# Patient Record
Sex: MALE | Race: WHITE | NOT HISPANIC OR LATINO | URBAN - METROPOLITAN AREA
[De-identification: names, ages, dates, MRNs, and addresses within clinical notes are randomized per-mention and may not be internally consistent; named-entity substitution may affect disease eponyms.]

---

## 2022-01-14 ENCOUNTER — EMERGENCY (EMERGENCY)
Facility: HOSPITAL | Age: 42
LOS: 1 days | Discharge: HOME | End: 2022-01-14
Attending: EMERGENCY MEDICINE | Admitting: EMERGENCY MEDICINE
Payer: COMMERCIAL

## 2022-01-14 VITALS
DIASTOLIC BLOOD PRESSURE: 89 MMHG | OXYGEN SATURATION: 99 % | SYSTOLIC BLOOD PRESSURE: 143 MMHG | HEART RATE: 82 BPM | RESPIRATION RATE: 18 BRPM | TEMPERATURE: 98 F | HEIGHT: 70 IN | WEIGHT: 229.94 LBS

## 2022-01-14 VITALS
HEART RATE: 78 BPM | TEMPERATURE: 98 F | RESPIRATION RATE: 18 BRPM | OXYGEN SATURATION: 99 % | SYSTOLIC BLOOD PRESSURE: 119 MMHG | DIASTOLIC BLOOD PRESSURE: 73 MMHG

## 2022-01-14 DIAGNOSIS — Z20.822 CONTACT WITH AND (SUSPECTED) EXPOSURE TO COVID-19: ICD-10-CM

## 2022-01-14 DIAGNOSIS — Z98.890 OTHER SPECIFIED POSTPROCEDURAL STATES: Chronic | ICD-10-CM

## 2022-01-14 DIAGNOSIS — F12.929 CANNABIS USE, UNSPECIFIED WITH INTOXICATION, UNSPECIFIED: ICD-10-CM

## 2022-01-14 DIAGNOSIS — F41.9 ANXIETY DISORDER, UNSPECIFIED: ICD-10-CM

## 2022-01-14 LAB
ALBUMIN SERPL ELPH-MCNC: 5 G/DL — SIGNIFICANT CHANGE UP (ref 3.3–5)
ALP SERPL-CCNC: 63 U/L — SIGNIFICANT CHANGE UP (ref 40–120)
ALT FLD-CCNC: 27 U/L — SIGNIFICANT CHANGE UP (ref 10–45)
ANION GAP SERPL CALC-SCNC: 12 MMOL/L — SIGNIFICANT CHANGE UP (ref 5–17)
APTT BLD: 34.8 SEC — SIGNIFICANT CHANGE UP (ref 27.5–35.5)
AST SERPL-CCNC: 23 U/L — SIGNIFICANT CHANGE UP (ref 10–40)
BASOPHILS # BLD AUTO: 0.03 K/UL — SIGNIFICANT CHANGE UP (ref 0–0.2)
BASOPHILS NFR BLD AUTO: 0.3 % — SIGNIFICANT CHANGE UP (ref 0–2)
BILIRUB SERPL-MCNC: 0.6 MG/DL — SIGNIFICANT CHANGE UP (ref 0.2–1.2)
BUN SERPL-MCNC: 23 MG/DL — SIGNIFICANT CHANGE UP (ref 7–23)
CALCIUM SERPL-MCNC: 9.3 MG/DL — SIGNIFICANT CHANGE UP (ref 8.4–10.5)
CHLORIDE SERPL-SCNC: 103 MMOL/L — SIGNIFICANT CHANGE UP (ref 96–108)
CK SERPL-CCNC: 130 U/L — SIGNIFICANT CHANGE UP (ref 30–200)
CO2 SERPL-SCNC: 25 MMOL/L — SIGNIFICANT CHANGE UP (ref 22–31)
CREAT SERPL-MCNC: 1.13 MG/DL — SIGNIFICANT CHANGE UP (ref 0.5–1.3)
EOSINOPHIL # BLD AUTO: 0.16 K/UL — SIGNIFICANT CHANGE UP (ref 0–0.5)
EOSINOPHIL NFR BLD AUTO: 1.7 % — SIGNIFICANT CHANGE UP (ref 0–6)
GLUCOSE SERPL-MCNC: 109 MG/DL — HIGH (ref 70–99)
HCT VFR BLD CALC: 39.8 % — SIGNIFICANT CHANGE UP (ref 39–50)
HGB BLD-MCNC: 14 G/DL — SIGNIFICANT CHANGE UP (ref 13–17)
IMM GRANULOCYTES NFR BLD AUTO: 0.4 % — SIGNIFICANT CHANGE UP (ref 0–1.5)
INR BLD: 1.05 — SIGNIFICANT CHANGE UP (ref 0.88–1.16)
LYMPHOCYTES # BLD AUTO: 1.28 K/UL — SIGNIFICANT CHANGE UP (ref 1–3.3)
LYMPHOCYTES # BLD AUTO: 13.5 % — SIGNIFICANT CHANGE UP (ref 13–44)
MCHC RBC-ENTMCNC: 29.1 PG — SIGNIFICANT CHANGE UP (ref 27–34)
MCHC RBC-ENTMCNC: 35.2 GM/DL — SIGNIFICANT CHANGE UP (ref 32–36)
MCV RBC AUTO: 82.7 FL — SIGNIFICANT CHANGE UP (ref 80–100)
MONOCYTES # BLD AUTO: 0.7 K/UL — SIGNIFICANT CHANGE UP (ref 0–0.9)
MONOCYTES NFR BLD AUTO: 7.4 % — SIGNIFICANT CHANGE UP (ref 2–14)
NEUTROPHILS # BLD AUTO: 7.26 K/UL — SIGNIFICANT CHANGE UP (ref 1.8–7.4)
NEUTROPHILS NFR BLD AUTO: 76.7 % — SIGNIFICANT CHANGE UP (ref 43–77)
NRBC # BLD: 0 /100 WBCS — SIGNIFICANT CHANGE UP (ref 0–0)
PCP SPEC-MCNC: SIGNIFICANT CHANGE UP
PLATELET # BLD AUTO: 248 K/UL — SIGNIFICANT CHANGE UP (ref 150–400)
POTASSIUM SERPL-MCNC: 4.3 MMOL/L — SIGNIFICANT CHANGE UP (ref 3.5–5.3)
POTASSIUM SERPL-SCNC: 4.3 MMOL/L — SIGNIFICANT CHANGE UP (ref 3.5–5.3)
PROT SERPL-MCNC: 7.9 G/DL — SIGNIFICANT CHANGE UP (ref 6–8.3)
PROTHROM AB SERPL-ACNC: 12.6 SEC — SIGNIFICANT CHANGE UP (ref 10.6–13.6)
RBC # BLD: 4.81 M/UL — SIGNIFICANT CHANGE UP (ref 4.2–5.8)
RBC # FLD: 12.3 % — SIGNIFICANT CHANGE UP (ref 10.3–14.5)
SARS-COV-2 RNA SPEC QL NAA+PROBE: SIGNIFICANT CHANGE UP
SODIUM SERPL-SCNC: 140 MMOL/L — SIGNIFICANT CHANGE UP (ref 135–145)
TROPONIN T SERPL-MCNC: 0.01 NG/ML — SIGNIFICANT CHANGE UP (ref 0–0.01)
WBC # BLD: 9.47 K/UL — SIGNIFICANT CHANGE UP (ref 3.8–10.5)
WBC # FLD AUTO: 9.47 K/UL — SIGNIFICANT CHANGE UP (ref 3.8–10.5)

## 2022-01-14 PROCEDURE — 80307 DRUG TEST PRSMV CHEM ANLYZR: CPT

## 2022-01-14 PROCEDURE — U0003: CPT

## 2022-01-14 PROCEDURE — 99285 EMERGENCY DEPT VISIT HI MDM: CPT

## 2022-01-14 PROCEDURE — 80053 COMPREHEN METABOLIC PANEL: CPT

## 2022-01-14 PROCEDURE — 85730 THROMBOPLASTIN TIME PARTIAL: CPT

## 2022-01-14 PROCEDURE — 84484 ASSAY OF TROPONIN QUANT: CPT

## 2022-01-14 PROCEDURE — 36415 COLL VENOUS BLD VENIPUNCTURE: CPT

## 2022-01-14 PROCEDURE — 93010 ELECTROCARDIOGRAM REPORT: CPT

## 2022-01-14 PROCEDURE — 99284 EMERGENCY DEPT VISIT MOD MDM: CPT

## 2022-01-14 PROCEDURE — U0005: CPT

## 2022-01-14 PROCEDURE — 93005 ELECTROCARDIOGRAM TRACING: CPT

## 2022-01-14 PROCEDURE — 82550 ASSAY OF CK (CPK): CPT

## 2022-01-14 PROCEDURE — 85610 PROTHROMBIN TIME: CPT

## 2022-01-14 PROCEDURE — 85025 COMPLETE CBC W/AUTO DIFF WBC: CPT

## 2022-01-14 RX ORDER — SODIUM CHLORIDE 9 MG/ML
500 INJECTION INTRAMUSCULAR; INTRAVENOUS; SUBCUTANEOUS ONCE
Refills: 0 | Status: COMPLETED | OUTPATIENT
Start: 2022-01-14 | End: 2022-01-14

## 2022-01-14 RX ADMIN — SODIUM CHLORIDE 500 MILLILITER(S): 9 INJECTION INTRAMUSCULAR; INTRAVENOUS; SUBCUTANEOUS at 13:20

## 2022-01-14 NOTE — ED ADULT TRIAGE NOTE - CHIEF COMPLAINT QUOTE
pt states " I took and edible to calm myself down, but now I have chest pain, sob, I feel very anxious" ekg in progress.

## 2022-01-14 NOTE — ED PROVIDER NOTE - CLINICAL SUMMARY MEDICAL DECISION MAKING FREE TEXT BOX
40 yo with no PMH presenting with anxiety and CP after taking 50 mg edible. Does not use marijuana regularly. Likely anxiety 2/2 to marijuana use. EKG NSR, no ischemic changes. f/u labs. Obs patient. If labs normal, d/c when THC effects wear off. 40 yo with no PMH presenting with anxiety and CP after taking 50 mg edible. Does not use marijuana regularly. Likely anxiety 2/2 to marijuana use. EKG NSR, no ischemic changes. Trop neg. Labs WNL. Continue obs. d/c when THC effects wear off.

## 2022-01-14 NOTE — ED PROVIDER NOTE - OBJECTIVE STATEMENT
42 yo with no pmh presents to ED after taking 50 mg 42 yo with no pmh presents to ED after taking 50 mg edible with anxiety, CP, and SOB. Reports at 9 am this morning he took edible. 30 mins later he felt off followed by paranoia, anxiety, feeling shaky, CP, SOB. His brother brought him to the ED. He uses marijuana very rarely, about 4x per year. Never taken an edible before, but has never had bad reaction after smoking.  He also had a pre-workout energy drink this morning prior to the gym. Felt well prior to edible. Denies fevers, HA, n/v/d. Takes no medications. No psych history, no h/o panic attacks or anxiety.

## 2022-01-14 NOTE — ED ADULT NURSE NOTE - OBJECTIVE STATEMENT
Pt presented with chief complaints of increasing anxiety and chest pain after ingestion of 50mg of edible this rning . AOX4, speaking full sentences, family @  bedside.  +PERRLA.  Patient denies shortness of breath, difficulty breathing and any form of distress not noted.  Chest rise equal and symmetrical bilaterally, trachea midline and lung sounds clear in BL upper and lower bases. Resps even and nonlabored.  Patient oriented to ED area. All needs attended. POC reviewed. Placed on continuos cardiac monitoring. EKG done.  Fall risk precautions maintained. Pt presented with chief complaints of increasing anxiety and chest pain after ingestion of 50mg of edible this morning . AOX4, speaking full sentences, family @  bedside.  +PERRLA.  Patient denies shortness of breath, difficulty breathing and any form of distress not noted.  Chest rise equal and symmetrical bilaterally, trachea midline and lung sounds clear in BL upper and lower bases. Resps even and nonlabored.  Patient oriented to ED area. All needs attended. POC reviewed. Placed on continuos cardiac monitoring. EKG done.  Fall risk precautions maintained.

## 2022-01-14 NOTE — ED PROVIDER NOTE - PHYSICAL EXAMINATION
Gen: lying in bed, shaking, appears uncomfortable  Neuro: A&Ox4  CV: RRR  Pulm: CTAB  Abd:+BS, Soft, tender, nondistended  Psych: endorses anxiety, denies hallucinations, SI/HI

## 2022-01-14 NOTE — ED PROVIDER NOTE - ATTENDING CONTRIBUTION TO CARE
Attending Statement: I have personally performed a face to face diagnostic evaluation on this patient. I have reviewed the CP2whed and agree with the history, exam and plan of care, except as noted.     Attending Contribution to Care:  41M with no PMH presenting with anxiety and CP after taking 50 mg edible. Does not use marijuana regularly. Agree with MS exam. Likely anxiety 2/2 to marijuana use. EKG NSR, no ischemic changes. Trop neg. Labs WNL. Pt observed and hydrated in the ER and now feeling much better. Pt feeling improved and is stable for DC. ED evaluation and management discussed with the patient in detail.  Close PMD follow up encouraged.  Strict ED return instructions discussed in detail and patient given the opportunity to ask any questions about their discharge diagnosis and instructions. Patient verbalized understanding.

## 2022-01-14 NOTE — ED PROVIDER NOTE - NSFOLLOWUPINSTRUCTIONS_ED_ALL_ED_FT
You were seen in the emergency room for anxiety and chest pain after marijuana use. Your EKG and cardiac labs (troponin) did no show any signs of cardiac complications. We gave you IV fluids and your symptoms improved over your visit to the ED. Upon discharge, you reported your symptoms resolved.    Your diagnosis is anxiety secondary to marijuana intoxication.     There are no signs of emergency conditions requiring admission to the hospital on today's workup.  Based on your evaluation, a preliminary diagnosis was made, however, further evaluation may be required by your primary care physician or a specialist for a more definitive diagnosis.  Please follow-up as directed and/or return to the Emergency Department if your symptoms change or worsen.    Follow up care:   1. See your primary care physician within the next 72 hours for follow up.  Bring a copy of your discharge paperwork (including any test results) to your doctor.    Cannabis Abuse     WHAT YOU NEED TO KNOW:    Cannabis (marijuana) abuse is a pattern of use that causes physical or mental problems. Cannabis can make you feel high, happy, or excited. The effects may start right away and last for 3 to 4 hours depending on whether you smoke or eat cannabis.    DISCHARGE INSTRUCTIONS:    Return to the emergency department if:     The effects of cannabis have worn off, and you have shortness of breath, a fast heart rate, or chest pain.        Contact your healthcare provider if:     You cannot fight the urge to use cannabis.      You have stopped using cannabis, and feel that you cannot cope with your withdrawal symptoms.      You want help or more information on how to decrease or stop using cannabis.      You have questions or concerns about your condition or care.    Signs of cannabis abuse:     Use prevents you from functioning at work or school, or causes you to be absent often or to do poor work      Use when it is dangerous to be under the effects of the drug, such as when you are driving a vehicle or using machinery       Problems with the police when you are under the effects of cannabis      Continuing to use cannabis even when you argue with your family and friends about your use      More cannabis is needed to give you the high feeling or other effects that you want      Withdrawal symptoms after you stop using cannabis    Signs of cannabis withdrawal: Cannabis withdrawal happens when you have used cannabis for a long period of time, and you suddenly take less or stop taking it. Withdrawal symptoms may start on the first day and may last up to 2 weeks. You may have more than one of the following:     Decreased appetite and weight loss       Night sweats and trouble sleeping      Craving for cannabis      Irritability       Feeling agitated, anxious, or restless      Depressed or negative mood    Treatment: Drug treatment or therapy is often used to treat cannabis abuse. You may need to stay in a treatment facility, or you may have outpatient therapy sessions. Therapy can be done with you and a talk therapist or in a group with others. This can help you learn good coping skills and ways to manage stress. The goal is to help you decrease or stop cannabis abuse in manageable steps. Your healthcare provider can give you more information about available drug and therapy treatments.    Follow up with your healthcare provider as directed: Write down your questions so you remember to ask them during your visits.       Return precautions:    *** Return immediately if you have chest pain, difficulty breathing, fever, a headinjury, wounds that won't stop bleeding, difficulty moving an arm or leg, unexplained sensory changes, or any other new/concerning symptoms. ***

## 2022-01-14 NOTE — ED ADULT NURSE REASSESSMENT NOTE - NS ED NURSE REASSESS COMMENT FT1
Patient given a warm blanket, placed in position of comfort and pillow. Will continue with plan of care.

## 2022-01-14 NOTE — ED PROVIDER NOTE - PATIENT PORTAL LINK FT
You can access the FollowMyHealth Patient Portal offered by [facility] by registering at the following website: [email or web address]. By joining [employer]’s FollowMyHealth portal, you will also be able to view your health information using other applications (apps) compatible with our system.

## 2024-08-05 NOTE — ED PROVIDER NOTE - CARDIOVASCULAR [+], MLM
Called patients daughter back.  No answer message left to call the office back.    CHEST PAIN/PALPITATIONS

## 2025-03-30 ENCOUNTER — INPATIENT (INPATIENT)
Facility: HOSPITAL | Age: 45
LOS: 1 days | Discharge: HOME | DRG: 446 | End: 2025-04-01
Attending: SURGERY | Admitting: SURGERY
Payer: COMMERCIAL

## 2025-03-30 VITALS
DIASTOLIC BLOOD PRESSURE: 88 MMHG | SYSTOLIC BLOOD PRESSURE: 165 MMHG | HEIGHT: 70 IN | RESPIRATION RATE: 22 BRPM | WEIGHT: 231.49 LBS | HEART RATE: 69 BPM | TEMPERATURE: 98 F | OXYGEN SATURATION: 97 %

## 2025-03-30 DIAGNOSIS — Z98.890 OTHER SPECIFIED POSTPROCEDURAL STATES: Chronic | ICD-10-CM

## 2025-03-30 PROCEDURE — 99285 EMERGENCY DEPT VISIT HI MDM: CPT

## 2025-03-30 NOTE — ED ADULT TRIAGE NOTE - CHIEF COMPLAINT QUOTE
"I was at the [employer] game and when I got off the train I had severe stomach pain. I vomited a few times"

## 2025-03-31 ENCOUNTER — RESULT REVIEW (OUTPATIENT)
Age: 45
End: 2025-03-31

## 2025-03-31 ENCOUNTER — TRANSCRIPTION ENCOUNTER (OUTPATIENT)
Age: 45
End: 2025-03-31

## 2025-03-31 DIAGNOSIS — Z98.890 OTHER SPECIFIED POSTPROCEDURAL STATES: Chronic | ICD-10-CM

## 2025-03-31 DIAGNOSIS — K81.0 ACUTE CHOLECYSTITIS: ICD-10-CM

## 2025-03-31 PROBLEM — Z78.9 OTHER SPECIFIED HEALTH STATUS: Chronic | Status: ACTIVE | Noted: 2022-01-14

## 2025-03-31 LAB
ABO RH CONFIRMATION: SIGNIFICANT CHANGE UP
ALBUMIN SERPL ELPH-MCNC: 3.8 G/DL — SIGNIFICANT CHANGE UP (ref 3.3–5)
ALP SERPL-CCNC: 58 U/L — SIGNIFICANT CHANGE UP (ref 30–120)
ALT FLD-CCNC: 45 U/L — SIGNIFICANT CHANGE UP (ref 10–60)
ANION GAP SERPL CALC-SCNC: 4 MMOL/L — LOW (ref 5–17)
APPEARANCE UR: CLEAR — SIGNIFICANT CHANGE UP
APTT BLD: 39.7 SEC — HIGH (ref 24.5–35.6)
AST SERPL-CCNC: 17 U/L — SIGNIFICANT CHANGE UP (ref 10–40)
BASOPHILS # BLD AUTO: 0.06 K/UL — SIGNIFICANT CHANGE UP (ref 0–0.2)
BASOPHILS NFR BLD AUTO: 0.8 % — SIGNIFICANT CHANGE UP (ref 0–2)
BILIRUB SERPL-MCNC: 0.2 MG/DL — SIGNIFICANT CHANGE UP (ref 0.2–1.2)
BILIRUB UR-MCNC: NEGATIVE — SIGNIFICANT CHANGE UP
BUN SERPL-MCNC: 19 MG/DL — SIGNIFICANT CHANGE UP (ref 7–23)
CALCIUM SERPL-MCNC: 8.7 MG/DL — SIGNIFICANT CHANGE UP (ref 8.4–10.5)
CHLORIDE SERPL-SCNC: 106 MMOL/L — SIGNIFICANT CHANGE UP (ref 96–108)
CO2 SERPL-SCNC: 33 MMOL/L — HIGH (ref 22–31)
COLOR SPEC: YELLOW — SIGNIFICANT CHANGE UP
CREAT SERPL-MCNC: 1.3 MG/DL — SIGNIFICANT CHANGE UP (ref 0.5–1.3)
DIFF PNL FLD: NEGATIVE — SIGNIFICANT CHANGE UP
EGFR: 70 ML/MIN/1.73M2 — SIGNIFICANT CHANGE UP
EGFR: 70 ML/MIN/1.73M2 — SIGNIFICANT CHANGE UP
EOSINOPHIL # BLD AUTO: 0.4 K/UL — SIGNIFICANT CHANGE UP (ref 0–0.5)
EOSINOPHIL NFR BLD AUTO: 5.1 % — SIGNIFICANT CHANGE UP (ref 0–6)
GLUCOSE SERPL-MCNC: 160 MG/DL — HIGH (ref 70–99)
GLUCOSE UR QL: NEGATIVE MG/DL — SIGNIFICANT CHANGE UP
HCT VFR BLD CALC: 38.5 % — LOW (ref 39–50)
HGB BLD-MCNC: 12.9 G/DL — LOW (ref 13–17)
IMM GRANULOCYTES NFR BLD AUTO: 0.4 % — SIGNIFICANT CHANGE UP (ref 0–0.9)
INR BLD: 0.98 RATIO — SIGNIFICANT CHANGE UP (ref 0.85–1.16)
KETONES UR-MCNC: NEGATIVE MG/DL — SIGNIFICANT CHANGE UP
LEUKOCYTE ESTERASE UR-ACNC: NEGATIVE — SIGNIFICANT CHANGE UP
LIDOCAIN IGE QN: 70 U/L — SIGNIFICANT CHANGE UP (ref 16–77)
LYMPHOCYTES # BLD AUTO: 3.15 K/UL — SIGNIFICANT CHANGE UP (ref 1–3.3)
LYMPHOCYTES # BLD AUTO: 39.8 % — SIGNIFICANT CHANGE UP (ref 13–44)
MCHC RBC-ENTMCNC: 28.3 PG — SIGNIFICANT CHANGE UP (ref 27–34)
MCHC RBC-ENTMCNC: 33.5 G/DL — SIGNIFICANT CHANGE UP (ref 32–36)
MCV RBC AUTO: 84.4 FL — SIGNIFICANT CHANGE UP (ref 80–100)
MONOCYTES # BLD AUTO: 0.81 K/UL — SIGNIFICANT CHANGE UP (ref 0–0.9)
MONOCYTES NFR BLD AUTO: 10.2 % — SIGNIFICANT CHANGE UP (ref 2–14)
NEUTROPHILS # BLD AUTO: 3.47 K/UL — SIGNIFICANT CHANGE UP (ref 1.8–7.4)
NEUTROPHILS NFR BLD AUTO: 43.7 % — SIGNIFICANT CHANGE UP (ref 43–77)
NITRITE UR-MCNC: NEGATIVE — SIGNIFICANT CHANGE UP
NRBC BLD AUTO-RTO: 0 /100 WBCS — SIGNIFICANT CHANGE UP (ref 0–0)
PH UR: 5.5 — SIGNIFICANT CHANGE UP (ref 5–8)
PLATELET # BLD AUTO: 363 K/UL — SIGNIFICANT CHANGE UP (ref 150–400)
POTASSIUM SERPL-MCNC: 4.1 MMOL/L — SIGNIFICANT CHANGE UP (ref 3.5–5.3)
POTASSIUM SERPL-SCNC: 4.1 MMOL/L — SIGNIFICANT CHANGE UP (ref 3.5–5.3)
PROT SERPL-MCNC: 8 G/DL — SIGNIFICANT CHANGE UP (ref 6–8.3)
PROT UR-MCNC: NEGATIVE MG/DL — SIGNIFICANT CHANGE UP
PROTHROM AB SERPL-ACNC: 11.4 SEC — SIGNIFICANT CHANGE UP (ref 9.9–13.4)
RBC # BLD: 4.56 M/UL — SIGNIFICANT CHANGE UP (ref 4.2–5.8)
RBC # FLD: 12.9 % — SIGNIFICANT CHANGE UP (ref 10.3–14.5)
SODIUM SERPL-SCNC: 143 MMOL/L — SIGNIFICANT CHANGE UP (ref 135–145)
SP GR SPEC: 1.02 — SIGNIFICANT CHANGE UP (ref 1–1.03)
UROBILINOGEN FLD QL: 0.2 MG/DL — SIGNIFICANT CHANGE UP (ref 0.2–1)
WBC # BLD: 7.92 K/UL — SIGNIFICANT CHANGE UP (ref 3.8–10.5)
WBC # FLD AUTO: 7.92 K/UL — SIGNIFICANT CHANGE UP (ref 3.8–10.5)

## 2025-03-31 PROCEDURE — 76705 ECHO EXAM OF ABDOMEN: CPT | Mod: 26

## 2025-03-31 PROCEDURE — 47563 LAPARO CHOLECYSTECTOMY/GRAPH: CPT | Mod: AS

## 2025-03-31 PROCEDURE — 88304 TISSUE EXAM BY PATHOLOGIST: CPT | Mod: 26

## 2025-03-31 PROCEDURE — 47563 LAPARO CHOLECYSTECTOMY/GRAPH: CPT

## 2025-03-31 PROCEDURE — 99223 1ST HOSP IP/OBS HIGH 75: CPT | Mod: 57

## 2025-03-31 PROCEDURE — 93010 ELECTROCARDIOGRAM REPORT: CPT

## 2025-03-31 DEVICE — LIGATING CLIPS WECK HEMOLOK POLYMER EXTRA LARGE (GOLD) 6: Type: IMPLANTABLE DEVICE | Status: FUNCTIONAL

## 2025-03-31 DEVICE — CLIP APPLIER COVIDIEN ENDOCLIP 10MM LARGE: Type: IMPLANTABLE DEVICE | Status: FUNCTIONAL

## 2025-03-31 RX ORDER — INDOCYANINE GREEN AND WATER 25 MG
1.25 KIT INJECTION ONCE
Refills: 0 | Status: COMPLETED | OUTPATIENT
Start: 2025-03-31 | End: 2025-03-31

## 2025-03-31 RX ORDER — ONDANSETRON HCL/PF 4 MG/2 ML
4 VIAL (ML) INJECTION ONCE
Refills: 0 | Status: DISCONTINUED | OUTPATIENT
Start: 2025-03-31 | End: 2025-03-31

## 2025-03-31 RX ORDER — HYDROMORPHONE/SOD CHLOR,ISO/PF 2 MG/10 ML
0.5 SYRINGE (ML) INJECTION
Refills: 0 | Status: DISCONTINUED | OUTPATIENT
Start: 2025-03-31 | End: 2025-03-31

## 2025-03-31 RX ORDER — HYDROMORPHONE/SOD CHLOR,ISO/PF 2 MG/10 ML
0.5 SYRINGE (ML) INJECTION EVERY 4 HOURS
Refills: 0 | Status: DISCONTINUED | OUTPATIENT
Start: 2025-03-31 | End: 2025-03-31

## 2025-03-31 RX ORDER — ACETAMINOPHEN 500 MG/5ML
1000 LIQUID (ML) ORAL EVERY 6 HOURS
Refills: 0 | Status: DISCONTINUED | OUTPATIENT
Start: 2025-03-31 | End: 2025-04-01

## 2025-03-31 RX ORDER — ONDANSETRON HCL/PF 4 MG/2 ML
4 VIAL (ML) INJECTION ONCE
Refills: 0 | Status: COMPLETED | OUTPATIENT
Start: 2025-03-31 | End: 2025-03-31

## 2025-03-31 RX ORDER — OXYCODONE HYDROCHLORIDE 30 MG/1
5 TABLET ORAL EVERY 6 HOURS
Refills: 0 | Status: DISCONTINUED | OUTPATIENT
Start: 2025-03-31 | End: 2025-04-01

## 2025-03-31 RX ORDER — PIPERACILLIN-TAZO-DEXTROSE,ISO 3.375G/5
3.38 IV SOLUTION, PIGGYBACK PREMIX FROZEN(ML) INTRAVENOUS ONCE
Refills: 0 | Status: COMPLETED | OUTPATIENT
Start: 2025-03-31 | End: 2025-03-31

## 2025-03-31 RX ORDER — MELATONIN 5 MG
5 TABLET ORAL AT BEDTIME
Refills: 0 | Status: DISCONTINUED | OUTPATIENT
Start: 2025-03-31 | End: 2025-04-01

## 2025-03-31 RX ORDER — INFLUENZA A VIRUS A/IDAHO/07/2018 (H1N1) ANTIGEN (MDCK CELL DERIVED, PROPIOLACTONE INACTIVATED, INFLUENZA A VIRUS A/INDIANA/08/2018 (H3N2) ANTIGEN (MDCK CELL DERIVED, PROPIOLACTONE INACTIVATED), INFLUENZA B VIRUS B/SINGAPORE/INFTT-16-0610/2016 ANTIGEN (MDCK CELL DERIVED, PROPIOLACTONE INACTIVATED), INFLUENZA B VIRUS B/IOWA/06/2017 ANTIGEN (MDCK CELL DERIVED, PROPIOLACTONE INACTIVATED) 15; 15; 15; 15 UG/.5ML; UG/.5ML; UG/.5ML; UG/.5ML
0.5 INJECTION, SUSPENSION INTRAMUSCULAR ONCE
Refills: 0 | Status: DISCONTINUED | OUTPATIENT
Start: 2025-03-31 | End: 2025-04-01

## 2025-03-31 RX ORDER — HYDROMORPHONE/SOD CHLOR,ISO/PF 2 MG/10 ML
0.25 SYRINGE (ML) INJECTION
Refills: 0 | Status: DISCONTINUED | OUTPATIENT
Start: 2025-03-31 | End: 2025-03-31

## 2025-03-31 RX ORDER — OXYCODONE HYDROCHLORIDE 30 MG/1
5 TABLET ORAL ONCE
Refills: 0 | Status: DISCONTINUED | OUTPATIENT
Start: 2025-03-31 | End: 2025-03-31

## 2025-03-31 RX ORDER — IBUPROFEN 200 MG
400 TABLET ORAL EVERY 6 HOURS
Refills: 0 | Status: DISCONTINUED | OUTPATIENT
Start: 2025-03-31 | End: 2025-04-01

## 2025-03-31 RX ORDER — SODIUM CHLORIDE 9 G/1000ML
1000 INJECTION, SOLUTION INTRAVENOUS
Refills: 0 | Status: DISCONTINUED | OUTPATIENT
Start: 2025-03-31 | End: 2025-03-31

## 2025-03-31 RX ORDER — ENOXAPARIN SODIUM 100 MG/ML
40 INJECTION SUBCUTANEOUS EVERY 24 HOURS
Refills: 0 | Status: DISCONTINUED | OUTPATIENT
Start: 2025-04-01 | End: 2025-04-01

## 2025-03-31 RX ORDER — HYDROMORPHONE/SOD CHLOR,ISO/PF 2 MG/10 ML
1 SYRINGE (ML) INJECTION EVERY 6 HOURS
Refills: 0 | Status: DISCONTINUED | OUTPATIENT
Start: 2025-03-31 | End: 2025-03-31

## 2025-03-31 RX ORDER — INDOCYANINE GREEN AND WATER 25 MG
1.25 KIT INJECTION ONCE
Refills: 0 | Status: DISCONTINUED | OUTPATIENT
Start: 2025-03-31 | End: 2025-03-31

## 2025-03-31 RX ORDER — PIPERACILLIN-TAZO-DEXTROSE,ISO 3.375G/5
3.38 IV SOLUTION, PIGGYBACK PREMIX FROZEN(ML) INTRAVENOUS EVERY 8 HOURS
Refills: 0 | Status: DISCONTINUED | OUTPATIENT
Start: 2025-03-31 | End: 2025-04-01

## 2025-03-31 RX ORDER — ACETAMINOPHEN 500 MG/5ML
1000 LIQUID (ML) ORAL ONCE
Refills: 0 | Status: DISCONTINUED | OUTPATIENT
Start: 2025-03-31 | End: 2025-03-31

## 2025-03-31 RX ORDER — SENNA 187 MG
2 TABLET ORAL AT BEDTIME
Refills: 0 | Status: DISCONTINUED | OUTPATIENT
Start: 2025-03-31 | End: 2025-04-01

## 2025-03-31 RX ADMIN — Medication 4 MILLIGRAM(S): at 01:20

## 2025-03-31 RX ADMIN — INDOCYANINE GREEN AND WATER 1.25 MILLIGRAM(S): KIT at 13:01

## 2025-03-31 RX ADMIN — Medication 400 MILLIGRAM(S): at 18:01

## 2025-03-31 RX ADMIN — Medication 4 MILLIGRAM(S): at 00:31

## 2025-03-31 RX ADMIN — SODIUM CHLORIDE 75 MILLILITER(S): 9 INJECTION, SOLUTION INTRAVENOUS at 17:32

## 2025-03-31 RX ADMIN — Medication 25 GRAM(S): at 18:45

## 2025-03-31 RX ADMIN — Medication 2 TABLET(S): at 21:27

## 2025-03-31 RX ADMIN — Medication 1000 MILLILITER(S): at 01:30

## 2025-03-31 RX ADMIN — Medication 1000 MILLILITER(S): at 00:30

## 2025-03-31 RX ADMIN — Medication 200 GRAM(S): at 02:42

## 2025-03-31 RX ADMIN — SODIUM CHLORIDE 75 MILLILITER(S): 9 INJECTION, SOLUTION INTRAVENOUS at 08:37

## 2025-03-31 RX ADMIN — Medication 400 MILLIGRAM(S): at 23:33

## 2025-03-31 RX ADMIN — Medication 1000 MILLIGRAM(S): at 19:01

## 2025-03-31 RX ADMIN — Medication 75 MILLILITER(S): at 02:42

## 2025-03-31 RX ADMIN — Medication 3.38 GRAM(S): at 03:13

## 2025-03-31 NOTE — H&P ADULT - NEGATIVE GASTROINTESTINAL SYMPTOMS
no diarrhea/no constipation/no change in bowel habits/no melena/no hematochezia/no steatorrhea/no jaundice

## 2025-03-31 NOTE — DISCHARGE NOTE PROVIDER - NSDCCPTREATMENT_GEN_ALL_CORE_FT
PRINCIPAL PROCEDURE  Procedure: Laparoscopic cholecystectomy with cholangiography  Findings and Treatment:

## 2025-03-31 NOTE — ED PROVIDER NOTE - OBJECTIVE STATEMENT
44y M c/o epigastric pain, n/v, pt states he had a slice of pizza for dinner and soon after started feeling upper abd bloating, pain, became nauseous, vomited, pain intensifying, makes it feel like he can't take a deep breath as it hurts more, felt a similar discomfort/bloating 2 nights ago after dinner, took pepto and eventually felt better, never had anything like this previously, no h/o abd surgery, no diarrhea, no fever, no urinary complaints, tried a stomach pill at home without relief

## 2025-03-31 NOTE — H&P ADULT - TIME BILLING
Reviewed with patient in detail, wife at bedside, his mother by phone, Surgical PA as well as today's RN team.

## 2025-03-31 NOTE — ED ADULT NURSE NOTE - CHIEF COMPLAINT QUOTE
"I was at the [employer] game and when I got off the train I had severe stomach pain. I vomited a few times"

## 2025-03-31 NOTE — DISCHARGE NOTE PROVIDER - HOSPITAL COURSE
43 YO MALE WITH NO SIGNIFICANT PAST MEDICAL OR SURGICAL HISTORY , HAVE BEEN TAKING OZEMPIC FOR THE 2 YEARS ON/ OFF FOR WEIGHT LOSS ( 30 IBS WEIGHT LOSS )  STOPPED ONE MONTH  AGO PRESENTED TO ER WITH EPIGASTRIC AND RUQ ABDOMINAL PAIN WITH N/V SINCE FRIDAY  NIGHT.     PATIENT HAD DINNER AT [facility]AL [employer] ON FRIDAY NIGHT SHORTLY AFTER EXPERIENCED EPIGASTRIC PAIN AND VOMITED 3 TIMES. HE WAS FINE ON SATURDAY ALTHOUGH HAD POOR APPETITIVE. AGAIN LAST NIGHT AFTER HAVING A SLICE OF PIZZA EXPERIENCED EPIGASTRIC PAIN FOLLOWED BY 2 EPISODES OF VOMITING.     PATIENT DENIES ANY FEVER, CHILLS, FOOD INTOLERANCE IN THE PAST, DIARRHEA, CONSTIPATION, CHANGE IN BOWEL HABITS, NSAIDs/ DRUGS /ETOH/ ASA USE, PRIOR GALLBLADDER ATTACKS, PUD OR JAUNDICE.     HE HAS A FAMILY HISTORY OF GALLBLADDER DISEASE IN BOTH SIDE OF FAMILY AND HAD A NORMAL EGD ONE YEAR AGO.                         12.9   7.92  )-----------( 363      ( 31 Mar 2025 00:19 )             38.5     31 Mar 2025 00:19    143    |  106    |  19     ----------------------------<  160    4.1     |  33     |  1.30     Ca    8.7        31 Mar 2025 00:19    TPro  8.0    /  Alb  3.8    /  TBili  0.2    /  DBili  x      /  AST  17     /  ALT  45     /  AlkPhos  58     31 Mar 2025 00:19    PT/INR - ( 31 Mar 2025 02:13 )   PT: 11.4 sec;   INR: 0.98 ratio     PTT - ( 31 Mar 2025 02:13 )  PTT:39.7 sec    Lipase: 70 U/L (03-31-25 @ 00:19)    US ABDOMEN RT UPR QUADRANT       INTERPRETATION:  CLINICAL INFORMATION: Clinical concern for acute   cholecystitis.    COMPARISON: None available.    TECHNIQUE: Sonography of the right upper quadrant.    FINDINGS:  Liver: Within normal limits.  Bile ducts: Normal caliber. Common bile duct measures 0.5 cm.  Gallbladder: Cholelithiasis. Positive sonographic Sevilla sign. Mildly   thickened gallbladder wall measuring up to 0.4 cm.  Pancreas: Visualized portions are within normal limits.  Right kidney: 10.0 cm. No hydronephrosis.  Ascites: None.  IVC: Visualized portions are within normal limits.    IMPRESSION:  Acute cholecystitis, in the setting of cholelithiasis.    PATIENT UNDERWENT UNEVENTFUL LAPAROSCOPIC CHOLECYSTECTOMY WITH ICG ON 03/31/2025.    POST OP WAS CONTINUED ON ZOSYN, STARTED ON CLEAR LIQUID DIET.     DIET WAS ADVANCED TO LOW FAT ON POD # 1.     43 YO MALE WITH NO SIGNIFICANT PAST MEDICAL OR SURGICAL HISTORY , HAVE BEEN TAKING OZEMPIC FOR THE 2 YEARS ON/ OFF FOR WEIGHT LOSS ( 30 IBS WEIGHT LOSS )  STOPPED ONE MONTH  AGO PRESENTED TO ER WITH EPIGASTRIC AND RUQ ABDOMINAL PAIN WITH N/V SINCE FRIDAY  NIGHT.     PATIENT HAD DINNER AT LOCAL [employer] ON FRIDAY NIGHT SHORTLY AFTER EXPERIENCED EPIGASTRIC PAIN AND VOMITED 3 TIMES. HE WAS FINE ON SATURDAY ALTHOUGH HAD POOR APPETITIVE. AGAIN LAST NIGHT AFTER HAVING A SLICE OF PIZZA EXPERIENCED EPIGASTRIC PAIN FOLLOWED BY 2 EPISODES OF VOMITING.     PATIENT DENIES ANY FEVER, CHILLS, FOOD INTOLERANCE IN THE PAST, DIARRHEA, CONSTIPATION, CHANGE IN BOWEL HABITS, NSAIDs/ DRUGS /ETOH/ ASA USE, PRIOR GALLBLADDER ATTACKS, PUD OR JAUNDICE.     HE HAS A FAMILY HISTORY OF GALLBLADDER DISEASE IN BOTH SIDE OF FAMILY AND HAD A NORMAL EGD ONE YEAR AGO.                         12.9   7.92  )-----------( 363      ( 31 Mar 2025 00:19 )             38.5     31 Mar 2025 00:19    143    |  106    |  19     ----------------------------<  160    4.1     |  33     |  1.30     Ca    8.7        31 Mar 2025 00:19    TPro  8.0    /  Alb  3.8    /  TBili  0.2    /  DBili  x      /  AST  17     /  ALT  45     /  AlkPhos  58     31 Mar 2025 00:19    PT/INR - ( 31 Mar 2025 02:13 )   PT: 11.4 sec;   INR: 0.98 ratio     PTT - ( 31 Mar 2025 02:13 )  PTT:39.7 sec    Lipase: 70 U/L (03-31-25 @ 00:19)    US ABDOMEN RT UPR QUADRANT       INTERPRETATION:  CLINICAL INFORMATION: Clinical concern for acute   cholecystitis.    COMPARISON: None available.    TECHNIQUE: Sonography of the right upper quadrant.    FINDINGS:  Liver: Within normal limits.  Bile ducts: Normal caliber. Common bile duct measures 0.5 cm.  Gallbladder: Cholelithiasis. Positive sonographic Sevilla sign. Mildly   thickened gallbladder wall measuring up to 0.4 cm.  Pancreas: Visualized portions are within normal limits.  Right kidney: 10.0 cm. No hydronephrosis.  Ascites: None.  IVC: Visualized portions are within normal limits.    IMPRESSION:  Acute cholecystitis, in the setting of cholelithiasis.    PATIENT UNDERWENT UNEVENTFUL LAPAROSCOPIC CHOLECYSTECTOMY WITH ICG ON 03/31/2025.    POST OP WAS CONTINUED ON ZOSYN, STARTED ON CLEAR LIQUID DIET.     DIET WAS ADVANCED TO LOW FAT ON POD # 1 AND PATIENT TOLERATED DIET     DISPOSITION: HOME, FOLLOW UP WITH DR. SOLOMON

## 2025-03-31 NOTE — H&P ADULT - HISTORY OF PRESENT ILLNESS
43 YO MALE WITH NO SIGNIFICANT PAST MEDICAL OR SURGICAL HISTORY , HAVE BEEN TAKING OZEMPIC FOR THE 2 YEARS ON/ OFF FOR WEIGHT LOSS ( 30 IBS WEIGHT LOSS )  STOPPED ONE MONTH  AGO PRESENTED TO ER WITH EPIGASTRIC AND RUQ ABDOMINAL PAIN WITH N/V SINCE FRIDAY  NIGHT.     PATIENT HAD DINNER AT LOCAL [employer] [facility] ON FRIDAY NIGHT SHORTLY AFTER EXPERIENCED EPIGASTRIC PAIN AND VOMITED 3 TIMES. HE WAS FINE ON SATURDAY ALTHOUGH HAD POOR APPETITIVE. AGAIN LAST NIGHT AFTER HAVING A SLICE OF PIZZA EXPERIENCED EPIGASTRIC PAIN FOLLOWED BY 2 EPISODES OF VOMITING.     PATIENT DENIES ANY FEVER, CHILLS, FOOD INTOLERANCE IN THE PAST, DIARRHEA, CONSTIPATION, CHANGE IN BOWEL HABITS, NSAIDs/ DRUGS /ETOH/ ASA USE, PRIOR GALLBLADDER ATTACKS, PUD OR JAUNDICE.     HE HAS A FAMILY HISTORY OF GALLBLADDER DISEASE IN BOTH SIDE OF FAMILY AND HAD A NORMAL EGD ONE YEAR AGO.

## 2025-03-31 NOTE — H&P ADULT - ASSESSMENT
BILIARY COLIC  ACUTE CHOLECYSTITIS WITH CHOLELITHIASIS ON SONOGRAM    NPO  ZOSYN  IV HYDRATION  POSSIBLE PLAN FOR CHOLECYSTECTOMY AFTER DR. SOLOMON'S  EVALUATION   BILIARY COLIC  ACUTE CHOLECYSTITIS WITH CHOLELITHIASIS ON SONOGRAM    NPO  ZOSYN  IV HYDRATION  POSSIBLE PLAN FOR CHOLECYSTECTOMY AFTER DR. SOLOMON'S  EVALUATION      Mr. Nunez was seen preoperatively in the holding area with his wife.    The history was confirmed and an examination personally performed confirming tenderness focal to RUQ.    The sonogram report was reviewed and then shared with patient/ wife/ mother.  Given his young age, good health and timely presentation, cholecystectomy has been recommended and offered as the standard of care.  The risks, benefits and nature of this operation were discussed at length.    These points included, but were not limited to the possibility of conversion to open surgery, the possibility of intra-operative imaging or the performance of a cholangiogram, the possibility of post-operative intra-abdominal drains, the possibility of biliary tract, visceral or vascular injury, the possibility of residual abdominal wall deformity, the possibility of infection or  abscess or post-operative collection, and the approximate size, number and location of the typical or expected operative incisions.    We have discussed that subtotal or partial cholecystectomy, cholecystostomy with drainage or a discontinued diagnostic laparoscopy is also possible pending the operative findings.    We have discussed that medical complications unrelated to the specific operative procedure are possible.    We have specifically discussed the possibility of Post Cholecystectomy Syndrome with residual abdominal pain or post-prandial gastrointestinal complaints.    We have discussed that this is a process which may be amenable to conservative management or may ultimately require further future gastroenterology evaluation.    We have discussed that Post Cholecystectomy Syndrome complaints may require long term dietary modifications with or without supplemental prescription medications.    We have reviewed that general anesthesia with intubation will be required.  We have discussed that pending post-operative progress, discharge may be appropriate or further inpatient care may be required.    Mr. Nunez demonstrated good understanding and remains eager to proceed.  His wife agrees.  An intravenous dosing of antibiotic has been provided prior to the initiation of the procedure and a dose of ICG administered.

## 2025-03-31 NOTE — DISCHARGE NOTE PROVIDER - CARE PROVIDER_API CALL
Pete Sam  Surgery  [address] [place] [address]  Phone: [phone]  Fax: [phone]  Follow Up Time:

## 2025-03-31 NOTE — CARE COORDINATION ASSESSMENT. - NSCAREPROVIDERS_GEN_ALL_CORE_FT
CARE PROVIDERS:  Access Services: Kristina Ortiz  Administration: Philip Galdamez  Administration: Olman Brennan  Administration: Chloe Jeong  Admitting: Dg Joseph  Attending: Pete Sam  Case Management: Maile Martinez  Consultant: Kylah Seth  Covering Team: Pete Sam  ED Attending: Suzi Tamez  ED Nurse: Bar Dickerson  Emergency Medicine: Sarah Harris  Nurse: Antione Akhtar  Nurse: Chaim Lim  Nurse: Nora Jaramillo  Nurse: Yesica Peoples  Nurse: Suzie Navarrete  Nurse: Leigh Davis  Ordered: Doctor, Unknown  Override: Leigh Davis  PCA/Nursing Assistant: William Alexander  Primary Team: Ena Garcia  Primary Team: Dillan Morris  Registered Dietitian: Lilliana Moya  Respiratory Therapy: Philip Colón  UR// Supp. Assoc.: Jen Billings  UR// Supp. Assoc.: Marialuisa Flores

## 2025-03-31 NOTE — CARE COORDINATION ASSESSMENT. - NSDCPLANSERVICES_GEN_ALL_CORE
45 YO MALE WITH NO SIGNIFICANT PAST MEDICAL OR SURGICAL HISTORY , HAVE BEEN TAKING OZEMPIC FOR THE 2 YEARS ON/ OFF FOR WEIGHT LOSS ( 30 IBS WEIGHT LOSS )  STOPPED ONE MONTH  AGO PRESENTED TO ER WITH EPIGASTRIC AND RUQ ABDOMINAL PAIN WITH N/V SINCE FRIDAY  NIGHT. OR TODAY.   CM met with patient at bedside.  Patient. A&O x 4.    Pt  resting comfortably / Pt has no complaints at this time.  CM explained role of CM and availability to assist with discharge planning throughout stay.   Provided CM contact information and pt. verbalized understanding. Patient lives in a  private house with his wife, Prior to admission patient  was ind in adls. Patient identified his wife  as his caregiver at home who will assist him during  his recuperation and will transport him home and to MD appointments.   Patient is pending OR today, No anticipated need identified at this time. CM will cont to be available for any possible needs after surgery.   Pt verbalizing understanding and in agreement with d/c plans.    PCP: Dr LOMELI   Pharm: [facility] [address]/No Anticipated Discharge Needs

## 2025-03-31 NOTE — PATIENT PROFILE ADULT - STATED REASON FOR ADMISSION
"started Friday night. having sharp stomach pain, vomited x3, ok by Saturday morning, yesterday felt fine, took son to see "[employer] game, had piece of pizza, sharp pain started again around 10pm, came to ER, couldn't breathe, sharp pain, sweating."

## 2025-03-31 NOTE — DISCHARGE NOTE PROVIDER - CARE PROVIDERS DIRECT ADDRESSES
[email or web address]

## 2025-03-31 NOTE — H&P ADULT - GASTROINTESTINAL
details… soft/nondistended/normal active bowel sounds/no guarding/no rigidity/no organomegaly/no palpable gwen/tender

## 2025-03-31 NOTE — H&P ADULT - NSHPLABSRESULTS_GEN_ALL_CORE
12.9   7.92  )-----------( 363      ( 31 Mar 2025 00:19 )             38.5     31 Mar 2025 00:19    143    |  106    |  19     ----------------------------<  160    4.1     |  33     |  1.30     Ca    8.7        31 Mar 2025 00:19    TPro  8.0    /  Alb  3.8    /  TBili  0.2    /  DBili  x      /  AST  17     /  ALT  45     /  AlkPhos  58     31 Mar 2025 00:19    PT/INR - ( 31 Mar 2025 02:13 )   PT: 11.4 sec;   INR: 0.98 ratio     PTT - ( 31 Mar 2025 02:13 )  PTT:39.7 sec    Urinalysis Basic - ( 31 Mar 2025 02:30 )  Color: Yellow / Appearance: Clear / S.020 / pH: x  Gluc: x / Ketone: Negative mg/dL  / Bili: Negative / Urobili: 0.2 mg/dL   Blood: x / Protein: Negative mg/dL / Nitrite: Negative   Leuk Esterase: Negative / RBC: x / WBC x   Sq Epi: x / Non Sq Epi: x / Bacteria: x    Lipase: 70 U/L (25 @ 00:19)    Type + Screen (25 @ 02:30)    ABO RH Interpretation: A POS      ACC: [id number] EXAM:  US ABDOMEN RT UPR QUADRANT   ORDERED BY: DEON BURRELL     PROCEDURE DATE:  2025      INTERPRETATION:  CLINICAL INFORMATION: Clinical concern for acute   cholecystitis.    COMPARISON: None available.    TECHNIQUE: Sonography of the right upper quadrant.    FINDINGS:  Liver: Within normal limits.  Bile ducts: Normal caliber. Common bile duct measures 0.5 cm.  Gallbladder: Cholelithiasis. Positive sonographic Sevilla sign. Mildly   thickened gallbladder wall measuring up to 0.4 cm.  Pancreas: Visualized portions are within normal limits.  Right kidney: 10.0 cm. No hydronephrosis.  Ascites: None.  IVC: Visualized portions are within normal limits.    IMPRESSION:  Acute cholecystitis, in the setting of cholelithiasis.     CHRISTOPHER MARKS MD; Attending Radiologist

## 2025-03-31 NOTE — DISCHARGE NOTE NURSING/CASE MANAGEMENT/SOCIAL WORK - PATIENT PORTAL LINK FT
You can access the FollowMyHealth Patient Portal offered by [facility] by registering at the following website: [email or web address]. By joining [employer]’s FollowMyHealth portal, you will also be able to view your health information using other applications (apps) compatible with our system.

## 2025-03-31 NOTE — CARE COORDINATION ASSESSMENT. - NSPASTMEDSURGHISTORY_GEN_ALL_CORE_FT
PAST MEDICAL & SURGICAL HISTORY:  No pertinent past medical history      H/O hand surgery      History of esophagogastroduodenoscopy (EGD)

## 2025-03-31 NOTE — DISCHARGE NOTE NURSING/CASE MANAGEMENT/SOCIAL WORK - FINANCIAL ASSISTANCE
[facility] provides services at a reduced cost to those who are determined to be eligible through [facility]’s financial assistance program. Information regarding [facility]’s financial assistance program can be found by going to [email or web address] or by calling [phone].

## 2025-03-31 NOTE — ED PROVIDER NOTE - CLINICAL SUMMARY MEDICAL DECISION MAKING FREE TEXT BOX
upper abd pain and n/v - iv, labs, us, pain/nausea control, may require admission/surgical intervention depending on findings

## 2025-03-31 NOTE — PATIENT PROFILE ADULT - FALL HARM RISK - RISK INTERVENTIONS
Assistance OOB with selected safe patient handling equipment/Assistance with ambulation/Communicate Fall Risk and Risk Factors to all staff, patient, and family/Monitor for mental status changes/Monitor gait and stability/Reinforce activity limits and safety measures with patient and family/Visual Cue: Yellow wristband/Bed in lowest position, wheels locked, appropriate side rails in place/Call bell, personal items and telephone in reach/Instruct patient to call for assistance before getting out of bed or chair/Non-slip footwear when patient is out of bed/[place] to call system/Physically safe environment - no spills, clutter or unnecessary equipment/Purposeful Proactive Rounding/Room/bathroom lighting operational, light cord in reach

## 2025-03-31 NOTE — ED ADULT NURSE NOTE - NS ED NURSE REPORT GIVEN DT
The Service to Endocrinology order in workqueue [id number] requested on 12/20/2018 has been removed as, unable to contact. Ordering provider has been notified.    Please contact patient, if further communication is needed.       31-Mar-2025 08:02

## 2025-03-31 NOTE — DISCHARGE NOTE PROVIDER - NSDCMRMEDTOKEN_GEN_ALL_CORE_FT
Motrin 400 mg oral tablet: 1 tab(s) orally every 6 hours as needed for MODERATE PAIN  oxyCODONE 5 mg oral tablet: 1 tab(s) orally every 6 hours as needed for SEVERE PAIN MDD: 4  Tylenol 500 mg oral tablet: 2 tab(s) orally every 8 hours as needed for MILD PAIN

## 2025-04-01 VITALS
SYSTOLIC BLOOD PRESSURE: 125 MMHG | OXYGEN SATURATION: 97 % | TEMPERATURE: 98 F | DIASTOLIC BLOOD PRESSURE: 78 MMHG | RESPIRATION RATE: 16 BRPM | HEART RATE: 72 BPM

## 2025-04-01 LAB
ALBUMIN SERPL ELPH-MCNC: 3.2 G/DL — LOW (ref 3.3–5)
ALP SERPL-CCNC: 60 U/L — SIGNIFICANT CHANGE UP (ref 30–120)
ALT FLD-CCNC: 106 U/L — HIGH (ref 10–60)
ANION GAP SERPL CALC-SCNC: 9 MMOL/L — SIGNIFICANT CHANGE UP (ref 5–17)
AST SERPL-CCNC: 48 U/L — HIGH (ref 10–40)
BASOPHILS # BLD AUTO: 0 K/UL — SIGNIFICANT CHANGE UP (ref 0–0.2)
BASOPHILS NFR BLD AUTO: 0 % — SIGNIFICANT CHANGE UP (ref 0–2)
BILIRUB SERPL-MCNC: 0.7 MG/DL — SIGNIFICANT CHANGE UP (ref 0.2–1.2)
BUN SERPL-MCNC: 14 MG/DL — SIGNIFICANT CHANGE UP (ref 7–23)
CALCIUM SERPL-MCNC: 8.8 MG/DL — SIGNIFICANT CHANGE UP (ref 8.4–10.5)
CHLORIDE SERPL-SCNC: 101 MMOL/L — SIGNIFICANT CHANGE UP (ref 96–108)
CO2 SERPL-SCNC: 26 MMOL/L — SIGNIFICANT CHANGE UP (ref 22–31)
CREAT SERPL-MCNC: 1.12 MG/DL — SIGNIFICANT CHANGE UP (ref 0.5–1.3)
EGFR: 84 ML/MIN/1.73M2 — SIGNIFICANT CHANGE UP
EGFR: 84 ML/MIN/1.73M2 — SIGNIFICANT CHANGE UP
EOSINOPHIL # BLD AUTO: 0 K/UL — SIGNIFICANT CHANGE UP (ref 0–0.5)
EOSINOPHIL NFR BLD AUTO: 0 % — SIGNIFICANT CHANGE UP (ref 0–6)
GLUCOSE SERPL-MCNC: 135 MG/DL — HIGH (ref 70–99)
HCT VFR BLD CALC: 36.6 % — LOW (ref 39–50)
HGB BLD-MCNC: 12.2 G/DL — LOW (ref 13–17)
IMM GRANULOCYTES NFR BLD AUTO: 0.2 % — SIGNIFICANT CHANGE UP (ref 0–0.9)
LYMPHOCYTES # BLD AUTO: 0.92 K/UL — LOW (ref 1–3.3)
LYMPHOCYTES # BLD AUTO: 10.1 % — LOW (ref 13–44)
MCHC RBC-ENTMCNC: 27.7 PG — SIGNIFICANT CHANGE UP (ref 27–34)
MCHC RBC-ENTMCNC: 33.3 G/DL — SIGNIFICANT CHANGE UP (ref 32–36)
MCV RBC AUTO: 83 FL — SIGNIFICANT CHANGE UP (ref 80–100)
MONOCYTES # BLD AUTO: 0.44 K/UL — SIGNIFICANT CHANGE UP (ref 0–0.9)
MONOCYTES NFR BLD AUTO: 4.9 % — SIGNIFICANT CHANGE UP (ref 2–14)
NEUTROPHILS # BLD AUTO: 7.69 K/UL — HIGH (ref 1.8–7.4)
NEUTROPHILS NFR BLD AUTO: 84.8 % — HIGH (ref 43–77)
NRBC BLD AUTO-RTO: 0 /100 WBCS — SIGNIFICANT CHANGE UP (ref 0–0)
PLATELET # BLD AUTO: 311 K/UL — SIGNIFICANT CHANGE UP (ref 150–400)
POTASSIUM SERPL-MCNC: 4.1 MMOL/L — SIGNIFICANT CHANGE UP (ref 3.5–5.3)
POTASSIUM SERPL-SCNC: 4.1 MMOL/L — SIGNIFICANT CHANGE UP (ref 3.5–5.3)
PROT SERPL-MCNC: 7.2 G/DL — SIGNIFICANT CHANGE UP (ref 6–8.3)
RBC # BLD: 4.41 M/UL — SIGNIFICANT CHANGE UP (ref 4.2–5.8)
RBC # FLD: 12.7 % — SIGNIFICANT CHANGE UP (ref 10.3–14.5)
SODIUM SERPL-SCNC: 136 MMOL/L — SIGNIFICANT CHANGE UP (ref 135–145)
WBC # BLD: 9.07 K/UL — SIGNIFICANT CHANGE UP (ref 3.8–10.5)
WBC # FLD AUTO: 9.07 K/UL — SIGNIFICANT CHANGE UP (ref 3.8–10.5)

## 2025-04-01 PROCEDURE — 96375 TX/PRO/DX INJ NEW DRUG ADDON: CPT

## 2025-04-01 PROCEDURE — C9399: CPT

## 2025-04-01 PROCEDURE — 36415 COLL VENOUS BLD VENIPUNCTURE: CPT

## 2025-04-01 PROCEDURE — 86900 BLOOD TYPING SEROLOGIC ABO: CPT

## 2025-04-01 PROCEDURE — 83690 ASSAY OF LIPASE: CPT

## 2025-04-01 PROCEDURE — 93005 ELECTROCARDIOGRAM TRACING: CPT

## 2025-04-01 PROCEDURE — 96365 THER/PROPH/DIAG IV INF INIT: CPT

## 2025-04-01 PROCEDURE — C1889: CPT

## 2025-04-01 PROCEDURE — 86901 BLOOD TYPING SEROLOGIC RH(D): CPT

## 2025-04-01 PROCEDURE — 76705 ECHO EXAM OF ABDOMEN: CPT

## 2025-04-01 PROCEDURE — 85730 THROMBOPLASTIN TIME PARTIAL: CPT

## 2025-04-01 PROCEDURE — 88304 TISSUE EXAM BY PATHOLOGIST: CPT

## 2025-04-01 PROCEDURE — 99285 EMERGENCY DEPT VISIT HI MDM: CPT

## 2025-04-01 PROCEDURE — 80053 COMPREHEN METABOLIC PANEL: CPT

## 2025-04-01 PROCEDURE — 85610 PROTHROMBIN TIME: CPT

## 2025-04-01 PROCEDURE — 86850 RBC ANTIBODY SCREEN: CPT

## 2025-04-01 PROCEDURE — 81003 URINALYSIS AUTO W/O SCOPE: CPT

## 2025-04-01 PROCEDURE — 85025 COMPLETE CBC W/AUTO DIFF WBC: CPT

## 2025-04-01 RX ORDER — IBUPROFEN 200 MG
1 TABLET ORAL
Qty: 0 | Refills: 0 | DISCHARGE

## 2025-04-01 RX ORDER — ACETAMINOPHEN 500 MG/5ML
2 LIQUID (ML) ORAL
Qty: 0 | Refills: 0 | DISCHARGE

## 2025-04-01 RX ORDER — OXYCODONE HYDROCHLORIDE 30 MG/1
1 TABLET ORAL
Qty: 10 | Refills: 0
Start: 2025-04-01

## 2025-04-01 RX ADMIN — Medication 25 GRAM(S): at 01:17

## 2025-04-01 RX ADMIN — Medication 1000 MILLIGRAM(S): at 00:36

## 2025-04-01 RX ADMIN — Medication 1000 MILLIGRAM(S): at 06:02

## 2025-04-01 RX ADMIN — ENOXAPARIN SODIUM 40 MILLIGRAM(S): 100 INJECTION SUBCUTANEOUS at 08:44

## 2025-04-01 RX ADMIN — Medication 400 MILLIGRAM(S): at 05:27

## 2025-04-01 NOTE — PROGRESS NOTE ADULT - SUBJECTIVE AND OBJECTIVE BOX
SHONA SINCLAIR  MRN-[id number] 44y Male    GENERAL SURGERY/ DR. SOLOMON    NO FEVER, CHILLS  NO N/V  SOME INCISIONAL DISCOMFORT  PASSING FLATUS, NO BM, VOIDING     MEDICATIONS  (STANDING):  acetaminophen   IVPB .. 1000 milliGRAM(s) IV Intermittent every 6 hours  enoxaparin Injectable 40 milliGRAM(s) SubCutaneous every 24 hours  influenza   Vaccine 0.5 milliLiter(s) IntraMuscular once  pantoprazole  Injectable 40 milliGRAM(s) IV Push daily  piperacillin/tazobactam IVPB.. 3.375 Gram(s) IV Intermittent every 8 hours  senna 2 Tablet(s) Oral at bedtime    MEDICATIONS  (PRN):  ibuprofen  Tablet. 400 milliGRAM(s) Oral every 6 hours PRN Temp greater or equal to 38.5C (101.3F), Moderate Pain (4 - 6)  melatonin 5 milliGRAM(s) Oral at bedtime PRN Insomnia  oxyCODONE    IR 5 milliGRAM(s) Oral every 6 hours PRN Severe Pain (7 - 10)     Vital Signs Last 24 Hrs  T(C): 36.6 (01 Apr 2025 03:36), Max: 36.7 (31 Mar 2025 08:30)  T(F): 97.9 (01 Apr 2025 03:36), Max: 98.1 (31 Mar 2025 08:30)  HR: 72 (01 Apr 2025 03:36) (62 - 82)  BP: 121/75 (01 Apr 2025 03:36) (103/52 - 126/95)  RR: 17 (01 Apr 2025 03:36) (12 - 20)  SpO2: 96% (01 Apr 2025 03:36) (95% - 99%)    Parameters below as of 01 Apr 2025 03:36  Patient On (Oxygen Delivery Method): room air    03-31-25 @ 07:01  -  04-01-25 @ 07:00  --------------------------------------------------------  IN: 1850 mL / OUT: 5 mL / NET: 1845 mL    POD # 1      LUNGS: CLEAR TO AUSCULTATION , NO W/R/R  ABDOMEN: SUPRAUMBILICAL, EPIGASTRIC AND X2 RIGHT SIDED TROCAT SITE WITH STERI- STRIP IN PLACE DRY AND INTACT. + BS, SOFT, NOT DISTENDED, SOME INCISIONAL TENDERNESS   EXTREMITY: NO EDEMA, NO CALF TENDERNESS                                        ASSESSMENT &  PLAN:      POD # 1 S/P LAPAROSCOPIC CHOLECYSTECTOMY WITH ICG     ADVANCE TO REGULAR DIET  F/U AM LABS  D/C PLAN HOME TODAY PENDING AM LABS AND DIET TOLERANCE       SHONA SINCLAIR  MRN-[id number] 44y Male    GENERAL SURGERY/ DR. SOLOMON    NO FEVER, CHILLS  NO N/V  SOME INCISIONAL DISCOMFORT  PASSING FLATUS, NO BM, VOIDING     MEDICATIONS  (STANDING):  acetaminophen   IVPB .. 1000 milliGRAM(s) IV Intermittent every 6 hours  enoxaparin Injectable 40 milliGRAM(s) SubCutaneous every 24 hours  influenza   Vaccine 0.5 milliLiter(s) IntraMuscular once  pantoprazole  Injectable 40 milliGRAM(s) IV Push daily  piperacillin/tazobactam IVPB.. 3.375 Gram(s) IV Intermittent every 8 hours  senna 2 Tablet(s) Oral at bedtime    MEDICATIONS  (PRN):  ibuprofen  Tablet. 400 milliGRAM(s) Oral every 6 hours PRN Temp greater or equal to 38.5C (101.3F), Moderate Pain (4 - 6)  melatonin 5 milliGRAM(s) Oral at bedtime PRN Insomnia  oxyCODONE    IR 5 milliGRAM(s) Oral every 6 hours PRN Severe Pain (7 - 10)     Vital Signs Last 24 Hrs  T(C): 36.6 (01 Apr 2025 03:36), Max: 36.7 (31 Mar 2025 08:30)  T(F): 97.9 (01 Apr 2025 03:36), Max: 98.1 (31 Mar 2025 08:30)  HR: 72 (01 Apr 2025 03:36) (62 - 82)  BP: 121/75 (01 Apr 2025 03:36) (103/52 - 126/95)  RR: 17 (01 Apr 2025 03:36) (12 - 20)  SpO2: 96% (01 Apr 2025 03:36) (95% - 99%)    Parameters below as of 01 Apr 2025 03:36  Patient On (Oxygen Delivery Method): room air    03-31-25 @ 07:01  -  04-01-25 @ 07:00  --------------------------------------------------------  IN: 1850 mL / OUT: 5 mL / NET: 1845 mL    POD # 1      LUNGS: CLEAR TO AUSCULTATION , NO W/R/R  ABDOMEN: SUPRAUMBILICAL, EPIGASTRIC AND X2 RIGHT SIDED TROCAR SITE WITH STERI- STRIP IN PLACE DRY AND INTACT. + BS, SOFT, NOT DISTENDED, SOME INCISIONAL TENDERNESS   EXTREMITY: NO EDEMA, NO CALF TENDERNESS                                        ASSESSMENT &  PLAN:      POD # 1 S/P LAPAROSCOPIC CHOLECYSTECTOMY WITH ICG     ADVANCE TO REGULAR DIET  F/U AM LABS  D/C PLAN HOME TODAY PENDING AM LABS AND DIET TOLERANCE

## 2025-04-01 NOTE — CASE MANAGEMENT PROGRESS NOTE - NSCMPROGRESSNOTE_GEN_ALL_CORE
Patient cleared for discharge today. No homecare needs identified at this time. Patient will follow up with his surgeon as instructed. Patient is agreeable with discharge and has arranged his won transport home.

## 2025-04-04 PROBLEM — Z00.00 ENCOUNTER FOR PREVENTIVE HEALTH EXAMINATION: Status: ACTIVE | Noted: 2025-04-04

## 2025-04-08 ENCOUNTER — APPOINTMENT (OUTPATIENT)
Dept: SURGERY | Facility: CLINIC | Age: 45
End: 2025-04-08
Payer: COMMERCIAL

## 2025-04-08 VITALS
DIASTOLIC BLOOD PRESSURE: 76 MMHG | TEMPERATURE: 97.7 F | HEART RATE: 81 BPM | HEIGHT: 68.5 IN | WEIGHT: 224.87 LBS | SYSTOLIC BLOOD PRESSURE: 122 MMHG | BODY MASS INDEX: 33.69 KG/M2 | OXYGEN SATURATION: 97 %

## 2025-04-08 DIAGNOSIS — Z78.9 OTHER SPECIFIED HEALTH STATUS: ICD-10-CM

## 2025-04-08 DIAGNOSIS — Z82.49 FAMILY HISTORY OF ISCHEMIC HEART DISEASE AND OTHER DISEASES OF THE CIRCULATORY SYSTEM: ICD-10-CM

## 2025-04-08 DIAGNOSIS — Z90.49 ACQUIRED ABSENCE OF OTHER SPECIFIED PARTS OF DIGESTIVE TRACT: ICD-10-CM

## 2025-04-08 DIAGNOSIS — Z87.19 PERSONAL HISTORY OF OTHER DISEASES OF THE DIGESTIVE SYSTEM: ICD-10-CM

## 2025-04-08 PROCEDURE — 99024 POSTOP FOLLOW-UP VISIT: CPT

## 2025-05-07 ENCOUNTER — APPOINTMENT (OUTPATIENT)
Dept: SURGERY | Facility: CLINIC | Age: 45
End: 2025-05-07

## 2025-07-02 ENCOUNTER — APPOINTMENT (OUTPATIENT)
Dept: INTERNAL MEDICINE | Facility: CLINIC | Age: 45
End: 2025-07-02

## 2025-07-02 ENCOUNTER — OUTPATIENT (OUTPATIENT)
Dept: OUTPATIENT SERVICES | Facility: HOSPITAL | Age: 45
LOS: 1 days | End: 2025-07-02

## 2025-07-02 DIAGNOSIS — Z98.890 OTHER SPECIFIED POSTPROCEDURAL STATES: Chronic | ICD-10-CM

## 2025-07-11 PROBLEM — E66.811 OBESITY (BMI 30.0-34.9): Status: ACTIVE | Noted: 2025-07-11

## 2025-07-11 PROBLEM — E66.9 OBESITY (BMI 30-39.9): Status: ACTIVE | Noted: 2025-07-11

## 2025-07-15 ENCOUNTER — APPOINTMENT (OUTPATIENT)
Dept: SURGERY | Facility: CLINIC | Age: 45
End: 2025-07-15
Payer: COMMERCIAL

## 2025-07-15 VITALS — HEIGHT: 70 IN | WEIGHT: 234 LBS | BODY MASS INDEX: 33.5 KG/M2

## 2025-07-15 PROCEDURE — 99204 OFFICE O/P NEW MOD 45 MIN: CPT | Mod: 95

## 2025-07-15 RX ORDER — TIRZEPATIDE 2.5 MG/.5ML
2.5 INJECTION, SOLUTION SUBCUTANEOUS
Qty: 2 | Refills: 0 | Status: ACTIVE | COMMUNITY
Start: 2025-07-15 | End: 1900-01-01

## 2025-08-01 RX ORDER — TIRZEPATIDE 5 MG/.5ML
5 INJECTION, SOLUTION SUBCUTANEOUS
Qty: 1 | Refills: 5 | Status: ACTIVE | COMMUNITY
Start: 2025-08-01 | End: 1900-01-01

## 2025-09-03 RX ORDER — TIRZEPATIDE 7.5 MG/.5ML
7.5 INJECTION, SOLUTION SUBCUTANEOUS
Qty: 1 | Refills: 0 | Status: ACTIVE | COMMUNITY
Start: 2025-09-03 | End: 1900-01-01

## (undated) DEVICE — ENDOCATCH 10MM

## (undated) DEVICE — DRAPE 3/4 SHEET 52X76"

## (undated) DEVICE — NDL HYPO SAFE 22G X 1.5" (BLACK)

## (undated) DEVICE — GLV 8 PROTEXIS (BLUE)

## (undated) DEVICE — SUT POLYSORB 3-0 30" V-20 UNDYED

## (undated) DEVICE — WARMING BLANKET UPPER ADULT

## (undated) DEVICE — ELCTR STRYKER NEPTUNE SMOKE EVACUATION PENCIL (GREEN)

## (undated) DEVICE — TUBING STRYKEFLOW II SUCTION / IRRIGATOR

## (undated) DEVICE — SOL IRR BAG NS 0.9% 3000ML

## (undated) DEVICE — BLADE SURGICAL #15 CARBON

## (undated) DEVICE — GLV 7.5 PROTEXIS (WHITE)

## (undated) DEVICE — VENODYNE/SCD SLEEVE CALF MEDIUM

## (undated) DEVICE — D HELP - CLEARVIEW CLEARIFY SYSTEM

## (undated) DEVICE — TROCAR ETHICON ENDOPATH XCEL BLADELESS 11MM X 100MM STABILITY

## (undated) DEVICE — DISSECTOR ENDOSCOPIC KITTNER SINGLE TIP

## (undated) DEVICE — SUT POLYSORB 0 30" GU-46

## (undated) DEVICE — TROCAR COVIDIEN VERSAONE BLUNT TIP HASSAN 12MM

## (undated) DEVICE — PACK GENERAL LAPAROSCOPY

## (undated) DEVICE — DRSG MASTISOL

## (undated) DEVICE — DRSG STERISTRIPS 0.5 X 4"

## (undated) DEVICE — SUT MAXON 4-0 30" P-12

## (undated) DEVICE — TUBING STRYKER PNEUMOSURE HI FLOW RTP

## (undated) DEVICE — SMOKE EVACUTATION SYS LAPROSCOPIC AC/PA

## (undated) DEVICE — TROCAR ETHICON ENDOPATH XCEL BLADELESS 5MM X 100MM STABILITY

## (undated) DEVICE — TROCAR ETHICON ENDOPATH XCEL UNIVERSAL SLEEVE WITH OPTIVIEW 5MM X 100MM

## (undated) DEVICE — ELCTR BOVIE TIP BLADE INSULATED 2.75" EDGE

## (undated) DEVICE — SOL IRR POUR NS 0.9% 1000ML